# Patient Record
Sex: MALE | Race: BLACK OR AFRICAN AMERICAN | Employment: FULL TIME | URBAN - METROPOLITAN AREA
[De-identification: names, ages, dates, MRNs, and addresses within clinical notes are randomized per-mention and may not be internally consistent; named-entity substitution may affect disease eponyms.]

---

## 2023-10-03 ENCOUNTER — TREATMENT (OUTPATIENT)
Dept: PHYSICAL THERAPY | Facility: CLINIC | Age: 33
End: 2023-10-03
Payer: COMMERCIAL

## 2023-10-03 DIAGNOSIS — M25.511 RIGHT SHOULDER PAIN: Primary | ICD-10-CM

## 2023-10-03 PROCEDURE — 97110 THERAPEUTIC EXERCISES: CPT | Mod: GP | Performed by: PHYSICAL THERAPIST

## 2023-10-03 NOTE — PROGRESS NOTES
Physical Therapy    Physical Therapy Treatment    Patient Name: Tony Elder  MRN: [id number]  Today's Date: 10/3/2023  Time Calculation  Start Time: 0830  Stop Time: 0915  Time Calculation (min): 45 min    INSURANCE:   Visit #2  4 visits Approved from 9/1 - 11/24/23  Auth needed.    ASSESSMENT:  Full ROM.  No pain today.  No pain with therex.  Does c/o intermittent pain.     PLAN:  Continue with shoulder stabilization.     PRECAUTIONS: none.      SUBJECTIVE:  Patient feels as though he is improving.  Intermittent R shoulder pain.        - Pain: intermittent mild pain.  - HEP adherence / understanding:  Pt reports compliance      OBJECTIVE:  Full pain free ROM post sessoin.      TREATMENT:  - Therex:Access Code: [email or web address] - updated 10/3      * Alternate isometrics with isotonics*   - Standing Isometric Shoulder External Rotation with Doorway - 2 x daily - 3 x weekly - 5 reps - 30 second hold - NP - cont with HEP  - Standing Isometric Shoulder Internal Rotation at Doorway - 2 x daily - 3 x weekly - 5 reps - 30 second hold - NP cont with HEP    Black and Silver band issued.  - Shoulder Internal Rotation with Resistance - 3 x weekly - 3 sets - 10 reps - NP continue with HEP  - Shoulder External Rotation with Anchored Resistance - 3 x weekly - 3 sets - 10 reps.  - NP cont with HEP  - Rhythmic stabs ER with arm at sides  - rhythmic stabs OH palms down horiz Abd   - rhythmic stabs  horiz abd 3 ways  - ER in 90 Abd  - Serratus up wall with Y band 2 x 10  - Cable Column Row: 20# 2 x 10  - ER in 90 Abd

## 2023-10-17 PROBLEM — M23.8X2 CREPITUS OF JOINT OF LEFT KNEE: Status: ACTIVE | Noted: 2023-10-17

## 2023-10-17 PROBLEM — S83.512A LEFT ANTERIOR CRUCIATE LIGAMENT TEAR: Status: ACTIVE | Noted: 2023-10-17

## 2023-10-17 PROBLEM — S69.90XA HAND INJURY: Status: ACTIVE | Noted: 2023-10-17

## 2023-10-17 PROBLEM — B35.4 TINEA CORPORIS: Status: ACTIVE | Noted: 2023-10-17

## 2023-10-17 PROBLEM — B07.9 VIRAL WART, UNSPECIFIED: Status: ACTIVE | Noted: 2022-05-25

## 2023-10-17 PROBLEM — E66.3 OVERWEIGHT: Status: ACTIVE | Noted: 2023-10-17

## 2023-10-17 PROBLEM — I49.3 PVC (PREMATURE VENTRICULAR CONTRACTION): Status: ACTIVE | Noted: 2023-10-17

## 2023-10-17 PROBLEM — R30.0 DYSURIA: Status: ACTIVE | Noted: 2023-10-17

## 2023-10-17 PROBLEM — A63.0 GENITAL WARTS: Status: ACTIVE | Noted: 2023-10-17

## 2023-10-17 PROBLEM — A60.02 HERPES GENITALIS IN MEN: Status: ACTIVE | Noted: 2023-10-17

## 2023-10-17 PROBLEM — M25.662 DECREASED RANGE OF MOTION OF LEFT KNEE: Status: ACTIVE | Noted: 2023-10-17

## 2023-10-17 RX ORDER — MELOXICAM 15 MG/1
15 TABLET ORAL DAILY
COMMUNITY
Start: 2023-08-21

## 2023-10-17 RX ORDER — VALACYCLOVIR HYDROCHLORIDE 500 MG/1
500 TABLET, FILM COATED ORAL DAILY
COMMUNITY

## 2023-10-17 NOTE — PROGRESS NOTES
Physical Therapy    Physical Therapy Treatment    Patient Name: Tony Elder  MRN: [id number]  Today's Date: @TD@       INSURANCE:   Visit #3  4 visits Approved from 9/1 - 11/24/23  Auth needed.    Diagnoses: @Dx@    ASSESSMENT:  Full ROM.  No pain today.  No pain with therex.  Does c/o intermittent pain.     PLAN:  Continue with shoulder stabilization.     PRECAUTIONS: none.      SUBJECTIVE:  Patient feels as though he is improving.  Intermittent R shoulder pain.        - Pain: intermittent mild pain.  - HEP adherence / understanding:  Pt reports compliance      OBJECTIVE:  Full pain free ROM post sessoin.      TREATMENT:  - Therex:Access Code: [email or web address] - updated 10/3      * Alternate isometrics with isotonics*   - Standing Isometric Shoulder External Rotation with Doorway - 2 x daily - 3 x weekly - 5 reps - 30 second hold - NP - cont with HEP  - Standing Isometric Shoulder Internal Rotation at Doorway - 2 x daily - 3 x weekly - 5 reps - 30 second hold - NP cont with HEP    Black and Silver band issued.  - Shoulder Internal Rotation with Resistance - 3 x weekly - 3 sets - 10 reps - NP continue with HEP  - Shoulder External Rotation with Anchored Resistance - 3 x weekly - 3 sets - 10 reps.  - NP cont with HEP  - Rhythmic stabs ER with arm at sides  - rhythmic stabs OH palms down horiz Abd   - rhythmic stabs  horiz abd 3 ways  - ER in 90 Abd  - Serratus up wall with Y band 2 x 10  - Cable Column Row: 20# 2 x 10  - ER in 90 Abd

## 2023-10-18 ENCOUNTER — TREATMENT (OUTPATIENT)
Dept: PHYSICAL THERAPY | Facility: CLINIC | Age: 33
End: 2023-10-18
Payer: COMMERCIAL

## 2023-10-18 DIAGNOSIS — M25.511 RIGHT SHOULDER PAIN: Primary | ICD-10-CM

## 2023-10-18 PROCEDURE — 97110 THERAPEUTIC EXERCISES: CPT | Mod: GP | Performed by: PHYSICAL THERAPIST

## 2023-10-18 NOTE — PROGRESS NOTES
"Physical Therapy    Physical Therapy Treatment    Patient Name: Tony Elder  MRN: [id number]  Today's Date: 10/18/2023  Time Calculation  Start Time: 0830  Stop Time: 0915  Time Calculation (min): 45 min    INSURANCE:   Visit #3  4 visits Approved from 9/1 - 11/24/23  Auth needed.    ASSESSMENT:  Full ROM.  No pain today.  No pain with therex.  Does c/o intermittent random pain R anterior shoulder that can be as high as 6/10 with GH movements.  Specifically shoulder ext seems to be aggravating.     PLAN:  Injury was several months ago.  Pt does not feel like he is progressing.  He is asking about an MRI.  He is HEP compliant.   Advised him to call Dr London office for further recommendation.   PRECAUTIONS: none.      SUBJECTIVE:  Patient reports intermittent pain upto 6/10 in the R anterior shoulder.         - Pain: intermittent mild pain.  - HEP adherence / understanding:  Pt reports compliance      OBJECTIVE:  Full pain free ROM throughout session.  + obriens.  Negative apprehension.      TREATMENT:  - Therex:Access Code: [email or web address] - updated 10/3      * Alternate isometrics with isotonics*   - Standing Isometric Shoulder External Rotation with Doorway - 2 x daily - 3 x weekly - 5 reps - 30 second hold - NP - cont with HEP  - Standing Isometric Shoulder Internal Rotation at Doorway - 2 x daily - 3 x weekly - 5 reps - 30 second hold - NP cont with HEP    Rhythmic stabs ER, 90 flexion, 120 flexion  GREEN  Ball on wall SLS Abd and Flexion up / down, side/side: x 30\" each  ER/IR in 90 Abd SLS:  3 x 10   Ys: red 3 x 10   Serratus punch: minimal weight: 3 x 10     **ACTIVITIES BELOW WERE NOT PERFORMED**   Black and Silver band issued.  - Shoulder Internal Rotation with Resistance - 3 x weekly - 3 sets - 10 reps - NP continue with HEP  - Shoulder External Rotation with Anchored Resistance - 3 x weekly - 3 sets - 10 reps.  - NP cont with HEP  - Rhythmic stabs ER with arm at sides  - rhythmic stabs OH palms down horiz Abd "   - rhythmic stabs  horiz abd 3 ways  - ER in 90 Abd  - Serratus up wall with Y band 2 x 10  - Cable Column Row: 20# 2 x 10  - ER in 90 Abd

## 2023-10-31 ENCOUNTER — TREATMENT (OUTPATIENT)
Dept: PHYSICAL THERAPY | Facility: CLINIC | Age: 33
End: 2023-10-31
Payer: COMMERCIAL

## 2023-10-31 DIAGNOSIS — M25.511 RIGHT SHOULDER PAIN: Primary | ICD-10-CM

## 2023-10-31 PROCEDURE — 97110 THERAPEUTIC EXERCISES: CPT | Mod: GP | Performed by: PHYSICAL THERAPIST

## 2023-10-31 NOTE — PROGRESS NOTES
"Physical Therapy    Physical Therapy Treatment    Patient Name: Tony Elder  MRN: [id number]  Today's Date: 10/31/2023  Time Calculation  Start Time: 0832  Stop Time: 0916  Time Calculation (min): 44 min    INSURANCE:   Visit #4  4 visits Approved from 9/1 - 11/24/23  Auth needed.  Last session 11/7/23    ASSESSMENT:  Full ROM.  No pain today.  No pain with therex.  Does c/o intermittent random pain R anterior shoulder that can be as high as 6/10 with GH movements.  Specifically shoulder ext seems to be aggravating.     PLAN:  Injury was several months ago.  Pt does not feel like he is progressing.  He is asking about an MRI.  He is HEP compliant.   Advised him to call Dr London office for further recommendation.   PRECAUTIONS: none.      SUBJECTIVE:  Patient reports intermittent pain upto 6/10 in the R anterior shoulder.    No pain currently.     - Pain: intermittent mild pain.  - HEP adherence / understanding:  Pt reports compliance      OBJECTIVE:  Full pain free ROM throughout session.  + obriens.  Negative apprehension.      TREATMENT:  - Therex:Access Code: [email or web address] - updated 10/3      * Alternate isometrics with isotonics*     - Standing Isometric Shoulder External Rotation with Doorway - 2 x daily - 3 x weekly - 5 reps - 30 second hold - NP - cont with HEP  - Standing Isometric Shoulder Internal Rotation at Doorway - 2 x daily - 3 x weekly - 5 reps - 30 second hold - NP cont with HEP    Arm Bike: 2.5/ s.5 level 15  Ball on wall SLS Abd and Flexion up / down, side/side: x 30\" each, 1# wrist weight  Cable Column ER: 5# 3 x 10   Cable Column IR: 10# 3 x 10   Ball Dribble wall in 90/90, OH dribble: 30\" ea  Ball drop and catch 90 flex, 90 Abd: 20 x ea  Ball drop and catch: prone      Rhythmic stabs ER, 90 flexion, 120 flexion  GREEN      ER SLS R 3 x 10       IR SLS Black 3 x 10   Rhythmic stabs supien 90 flex and s/l 90 abd 30\" x 2  ea    *ACTIVITIES BELOW WERE NOT PERFORMED**   Black and Silver band " issued.  - Shoulder Internal Rotation with Resistance - 3 x weekly - 3 sets - 10 reps - NP continue with HEP  - Shoulder External Rotation with Anchored Resistance - 3 x weekly - 3 sets - 10 reps.  - NP cont with HEP  - Rhythmic stabs ER with arm at sides  - rhythmic stabs OH palms down horiz Abd   - rhythmic stabs  horiz abd 3 ways  - ER in 90 Abd  - Serratus up wall with Y band 2 x 10  - Cable Column Row: 20# 2 x 10  - ER in 90 Abd

## 2023-11-07 ENCOUNTER — TREATMENT (OUTPATIENT)
Dept: PHYSICAL THERAPY | Facility: CLINIC | Age: 33
End: 2023-11-07
Payer: COMMERCIAL

## 2023-11-07 DIAGNOSIS — M25.511 RIGHT SHOULDER PAIN: Primary | ICD-10-CM

## 2023-11-07 PROCEDURE — 97110 THERAPEUTIC EXERCISES: CPT | Mod: GP | Performed by: PHYSICAL THERAPIST

## 2024-04-16 ENCOUNTER — OFFICE VISIT (OUTPATIENT)
Dept: ORTHOPEDIC SURGERY | Facility: CLINIC | Age: 34
End: 2024-04-16
Payer: COMMERCIAL

## 2024-04-16 ENCOUNTER — HOSPITAL ENCOUNTER (OUTPATIENT)
Dept: RADIOLOGY | Facility: CLINIC | Age: 34
Discharge: HOME | End: 2024-04-16
Payer: COMMERCIAL

## 2024-04-16 DIAGNOSIS — M25.551 RIGHT HIP PAIN: ICD-10-CM

## 2024-04-16 PROCEDURE — 99213 OFFICE O/P EST LOW 20 MIN: CPT | Performed by: ORTHOPAEDIC SURGERY

## 2024-04-16 PROCEDURE — 73502 X-RAY EXAM HIP UNI 2-3 VIEWS: CPT | Mod: RT

## 2024-04-16 PROCEDURE — 73502 X-RAY EXAM HIP UNI 2-3 VIEWS: CPT | Mod: RIGHT SIDE | Performed by: RADIOLOGY

## 2024-04-16 RX ORDER — METHYLPREDNISOLONE 4 MG/1
TABLET ORAL
Qty: 21 TABLET | Refills: 0 | Status: SHIPPED | OUTPATIENT
Start: 2024-04-16

## 2024-04-16 NOTE — PROGRESS NOTES
"    History of Present Illness:   Patient presents today for evaluation of right posterior hip, buttock pain.  Patient also endorses some mild low back pain.  There is no recent trauma or any significant history of lumbar spine issues.  The patient does not endorse any significant groin or anterior hip pain.  The pain is described as episodic severe and does radiate down the leg the buttock posteriorly.  Regarding prior treatments: He has done some light home stretching and exercises to \"stretch it out\" as he feels it is tight along the posterior buttock and thigh.    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    Patient is known to us for prior ACL reconstruction.\    Review of Systems   GENERAL: Negative  GI: Negative  MUSCULOSKELETAL: See HPI  SKIN: Negative  NEURO:  Negative    Physical Examination:  Right hip:  Normal gait  Negative Trendelenburg sign  Skin healthy and intact  No tenderness to palpation over lumbar spine  No tenderness over greater trochanter    Full forward flexion  Symmetric motion, no loss of internal rotation   No weakness with resisted hip flexion, abduction or adduction    Negative flexion/adduction/internal rotation  Negative flexion/abduction/external rotation test  Pain with straight leg raise    Motor exam: Gross strength intact knee flexion extension, hip flexion extension, ankle dorsiflexion plantarflexion without any obvious deficits  Sensation intact L2-S1  No upper motor neuron signs    Imaging:  Plain films bilateral hip today reveal no acute fractures or dislocation, good alignment and position, no evidence of DJD.    Assessment:   Patient with right lumbar radiculopathy    Plan:  We reviewed the disease process with the patient we discussed a variety of treatment options.  We reviewed the importance of physical therapy including home exercises for core stretching and stability, hamstring " flexibility etc.  We reviewed oral medications including NSAIDs risks and benefits, Medrol Dosepak, muscle relaxers.    We discussed treatment options ranging from nonoperative, physical therapy medications, image guided injections, surgical intervention etc.    We will refer to the spine team for evaluation.  We also discussed Medrol Dosepak and physical therapy as we do feel this will substantially help him over the short course of treatment.    Darrion Collier PA-C    In a face to face encounter, I evaluated the patient and performed a physical examination, discussed pertinent diagnostic studies if indicated and discussed diagnosis and management strategies with both the patient and physician assistant / nurse practitioner.  I reviewed the PA/NP's note and agree with the documented findings and plan of care.        Rosalino Romero MD

## 2024-05-08 ENCOUNTER — EVALUATION (OUTPATIENT)
Dept: PHYSICAL THERAPY | Facility: CLINIC | Age: 34
End: 2024-05-08
Payer: COMMERCIAL

## 2024-05-08 DIAGNOSIS — M25.551 RIGHT HIP PAIN: ICD-10-CM

## 2024-05-08 PROCEDURE — 97110 THERAPEUTIC EXERCISES: CPT | Mod: GP

## 2024-05-08 PROCEDURE — 97161 PT EVAL LOW COMPLEX 20 MIN: CPT | Mod: GP

## 2024-05-08 ASSESSMENT — PATIENT HEALTH QUESTIONNAIRE - PHQ9
2. FEELING DOWN, DEPRESSED OR HOPELESS: NOT AT ALL
1. LITTLE INTEREST OR PLEASURE IN DOING THINGS: NOT AT ALL
SUM OF ALL RESPONSES TO PHQ9 QUESTIONS 1 AND 2: 0

## 2024-05-08 NOTE — PROGRESS NOTES
Physical Therapy    Physical Therapy Evaluation    Patient Name: Tony Elder  MRN: [id number]  Today's Date: 05/08/24  Time Calculation  Start Time: 0955  Stop Time: 1030  Time Calculation (min): 35 min    Therapy Diagnoses:    Problem List Items Addressed This Visit    None  Visit Diagnoses         Codes    Right hip pain     M25.551    Relevant Orders    Follow Up In Physical Therapy            Visit #: 1     Insurance:   Payor: [employer] / Plan: [employer] / Product Type: *No Product type* /   Authorization required: yes  Authorized visits: TBD  Authorized date range: TBD   Referring Physician: Rosalino Romero MD      Subjective:  Reason For Visit: Initial Evaluation  - CC: Patient arrives with complaint of constant low level low back pain and intermittent sharp R hip pain.  - DOI: 03/15/2024  - SARAH: insidious onset  - IMAGING: x-ray reveals unremarkable radiographs of R hip   - HX: ACLR 2022  - PAIN: CURRENT: (3/10); BEST: (0/10); WORST: (9/10); LOCATION: (R upper gluteals to lateral thigh); Description: constant dull / occasional sharp  - ALLEVIATES PAIN: walking   - EXACERBATES PAIN: sleeping, first movements  - CURRENT MEDICAL MANAGEMENT: Has attempted naproxen and Z pack without relief   - PLOF: Patient reports no functional limitations prior to injury.   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 )   - EXERCISE: body weight exercises infrequently  - PATIENT'S GOAL: reduce pain, do sit ups, get back to 100% function       Precautions:  Fall Risk: None  PMH: L ACL 2022       Objective:   Other Measures  Lower Extremity Funtional Score (LEFS): 60    POSTURE: fair - forward flexed posture in sitting      LUMBAR AROM (in standing):   WNL all directions without symptom provocation    REPEATED MOVEMENTS:   Standing Ext: No better / no worse   Standing Flex: No better / no worse   Supine Flex: No better / no worse   Prone Ext:  No better / no worse      HIP PROM: WNL and pain free in all directions bilaterally.      MYOTOMES: LEs WNL bilaterally  MMT (out of 5)   Hip Flex (L1/L2) R/L: 4+ / 4+  Knee Ext (L3) R/L:  5 / 5  Ankle DF (L4) R/L: 5 / 5  GT Ext (L5) R/L: 5 / 5  Ankle PF/Inv (S1) R/L: 5 / 5  Knee Flex (S2) R/L: 5 / 5    Hip Abd R/L: 4/4  Hip Ext R/L: 4-/4-    SPECIAL TESTS:   SLR: Neg to 90 deg bilaterally  Contralateral SLR: Neg   Spring: Neg     PALPATION: Patient reports some sensitivity with palpation of R iliac crest and upper gluteals. Patient denies pain upon palpation of the lumbar spinous processes, lumbar transverse processes, sacral segments, and along the pathway of the sciatic nerve.      OBSERVATION: Patient initially demoed loss of pelvic plane performing bridge with excessive lumbar extension leading to pain. Instructed on neutral spine position, eliminating pain.        Goals:  -LEFS: 75/80 to to indicate a significant improvement in overall function.  -Modified Oswestry: 0-19% impairment to indicate a significant improvement in overall function.  -Patient will demonstrate correct posture with min to no cueing to allow for correct loading strategy   -Patient will demonstrate 5/5 hip strength to allow for correct gait and lifting mechanics.   -Patient will report standing for >30 minutes without pain to allow for full participation at work.   -Patient will demonstrate appropriate body mechanics for lifting, pushing, pulling, and common functional activities to reduce incidence or future back pain exacerbations.      Treatment:   1) Initial evaluation - Low complexity (25 minutes)   2) Patient educated on POC, HEP, and current condition.   3) Therapeutic exercise performed (10 minutes)  4) HEP Provided (See Below)     Access Code: [email or web address]  URL: [email or web address]  Date: 05/08/2024  Prepared by: Ozzie Ceja    Exercises  - Plank on Knees  - 1 x daily - 7 x weekly - 3 sets - 30 sec hold  - Supine Bridge with  Resistance Band  - 1 x daily - 7 x weekly - 3 sets - 12 reps  - Clamshell with Resistance  - 1 x daily - 7 x weekly - 3 sets - 12 reps - 3 sec hold    Assessment:   Tony Elder is a 33 y.o. year old male who participated in a physical therapy evaluation today due to complaint of R hip pain. Patient reports a constant dull ache in lumbar spine and intermittently severe sharp pain in upper gluteals and lateral hip. The patient's findings are consistent with lumbar radiculopathy, most likely due to core instability. Their impairments include Pain, Strength, Activity limitations, Motor function/control, Posture, Decreased functional level, Aerobic capacity/endurance, and Participation restrictions. Due to these impairments the patient is unable to perform Lifting, Sleeping, Working, Standing, and Sitting. Tony will benefit from continued skilled physical therapy as well as development of a home exercise program to address current impairments and progress towards return to their prior level of function without limitations.    Rehab Potential: good    Clinical Presentation:  Stable and/or uncomplicated characteristics    Level of Complexity: Low    Plan:     Recommended Treatment:  Therapeutic exercise, Manual therapy, Home program instruction and progression, Neuromuscular re-education, Therapeutic activities, Self care and home management, Instruction in activity modification, Electrical stimulation, Vasopneumatic device + cold, and Dry Needling    Recommended Visits: 1-2 visits x 6-8 weeks     Patient in agreement with POC.

## 2024-05-15 ENCOUNTER — TREATMENT (OUTPATIENT)
Dept: PHYSICAL THERAPY | Facility: CLINIC | Age: 34
End: 2024-05-15
Payer: COMMERCIAL

## 2024-05-15 DIAGNOSIS — M25.551 RIGHT HIP PAIN: ICD-10-CM

## 2024-05-15 PROCEDURE — 97140 MANUAL THERAPY 1/> REGIONS: CPT | Mod: GP

## 2024-05-15 PROCEDURE — 97110 THERAPEUTIC EXERCISES: CPT | Mod: GP

## 2024-05-15 NOTE — PROGRESS NOTES
Physical Therapy Treatment     Patient Name: Tony Elder  MRN: [id number]  Today's Date: 05/15/24  Time Calculation  Start Time: 0230  Stop Time: 0308  Time Calculation (min): 38 min    Visit #  2    INSURANCE  Payor: [employer] / Plan: [employer] / Product Type: *No Product type* /   Authorization required: yes  Authorized visits: 6  Authorized date range: AUTH 6V 5/8-6/28/24    Referring Physician: Rosalino Romero MD     CURRENT PROBLEM  Problem List Items Addressed This Visit    None  Visit Diagnoses         Codes    Right hip pain     M25.551            ASSESSMENT    Patient presented without significant changes. Pain now at 0/10, but remains up to 9/10 with first movement in morning. ROM unchanged since last visit, but remains excellent. Progressed through instruction of side star plank and bird dog to continue core strengthening/stabilization . Required only min verbal cueing and tactile cueing to complete today's progression with correct form. Patient tolerated today's session was tolerated well without any increase in pain or irritation. Additionally discussed sleep positions and gradual movement progression for mornings. They are progressing towards their goals as evidenced by improvement in understanding. At this time, the patient would continue to benefit from skilled PT to address remaining functional, objective and subjective deficits to allow them to return to their prior level of function.      PLAN  Continue to focus on core strength, trunk stability, and functional mobility per POC.     SUBJECTIVE  General: Patient arrived reporting no real improvement. Pain gets better throughout day, but remains very high with radicular/referred symptoms in morning/     Pain:   Intensity: 0 /10  Location: R hip/lumbar  Description: sharp     HEP Update:   Compliant: yes; 7 days per week    Precautions:   Fall Risk: None  PMH: L ACL 2022      OBJECTIVE  No longer reports pain with palpation of R iliac crest and  "upper gluteals.        TREATMENT    Therapeutic Exercise:     33 minutes  Recumbent bike level 8 x 5 min   Supine bridge + black TB 2 x 12   Side-lying clamshell black TB 2 x 12   Side plank on knees + hip abd 2 x 30\" R/L  Full plank on elbows 2 x 30\"     Manual Therapy:      5 minutes  STM and TP release lumbar paraspinals  PA glide grade lumbar spine    Neuromuscular Re-education:     minutes  none    Modalities:        minutes  none    Gait Training:            minutes  None    EDUCATION    Education Provided: Reviewed home exercise program. Home exercise program instructed and issued. Provided manual cues for correct performance of exercises. Provided verbal feedback to improve exercise technique.    HEP Access Code: [email or web address]      "

## 2024-05-29 ENCOUNTER — APPOINTMENT (OUTPATIENT)
Dept: PHYSICAL THERAPY | Facility: CLINIC | Age: 34
End: 2024-05-29
Payer: COMMERCIAL

## 2024-05-29 NOTE — PROGRESS NOTES
Physical Therapy Treatment     Patient Name: Tony Elder  MRN: [id number]  Today's Date: 05/29/24       Visit #  Visit count could not be calculated. Make sure you are using a visit which is associated with an episode.    INSURANCE  Payor: [facility] / Plan: [facility] / Product Type: *No Product type* /   Authorization required: yes  Authorized visits: 6  Authorized date range: AUTH 6V 5/8-6/28/24    Referring Physician: Rosalino Romero MD     CURRENT PROBLEM  Problem List Items Addressed This Visit    None        ASSESSMENT    Patient presented without significant changes. Pain now at 0/10, but remains up to 9/10 with first movement in morning. ROM unchanged since last visit, but remains excellent. Progressed through instruction of side star plank and bird dog to continue core strengthening/stabilization . Required only min verbal cueing and tactile cueing to complete today's progression with correct form. Patient tolerated today's session was tolerated well without any increase in pain or irritation. Additionally discussed sleep positions and gradual movement progression for mornings. They are progressing towards their goals as evidenced by improvement in understanding. At this time, the patient would continue to benefit from skilled PT to address remaining functional, objective and subjective deficits to allow them to return to their prior level of function.      PLAN  Continue to focus on core strength, trunk stability, and functional mobility per POC.     SUBJECTIVE  General: Patient arrived reporting no real improvement. Pain gets better throughout day, but remains very high with radicular/referred symptoms in morning/     Pain:   Intensity: 0 /10  Location: R hip/lumbar  Description: sharp     HEP Update:   Compliant: yes; 7 days per week    Precautions:   Fall Risk: None  PMH: L ACL 2022      OBJECTIVE  No longer reports pain with palpation of R iliac crest and upper gluteals.        TREATMENT    Therapeutic  "Exercise:       minutes  Recumbent bike level 8 x 5 min   Supine bridge + black TB 2 x 12   Side-lying clamshell black TB 2 x 12   Side plank on knees + hip abd 2 x 30\" R/L  Full plank on elbows 2 x 30\"     Manual Therapy:        minutes  STM and TP release lumbar paraspinals  PA glide grade lumbar spine    Neuromuscular Re-education:     minutes  none    Modalities:        minutes  none    Gait Training:            minutes  None    EDUCATION    Education Provided: Reviewed home exercise program. Home exercise program instructed and issued. Provided manual cues for correct performance of exercises. Provided verbal feedback to improve exercise technique.    HEP Access Code: [email or web address]      "

## 2024-06-12 ENCOUNTER — DOCUMENTATION (OUTPATIENT)
Dept: PHYSICAL THERAPY | Facility: CLINIC | Age: 34
End: 2024-06-12
Payer: COMMERCIAL

## 2024-06-12 ENCOUNTER — APPOINTMENT (OUTPATIENT)
Dept: PHYSICAL THERAPY | Facility: CLINIC | Age: 34
End: 2024-06-12
Payer: COMMERCIAL

## 2024-06-12 NOTE — PROGRESS NOTES
Physical Therapy Communication Note    Patient Name: Tony Elder  MRN: [id number]  Today's Date: 6/12/2024     Missed Time: Cancel    Missed Visit Reason: Unknown    Communication: Patient left message at  canceling appointment this morning.

## 2024-06-12 NOTE — PROGRESS NOTES
Physical Therapy Treatment     Patient Name: Tony Elder  MRN: [id number]  Today's Date: 06/12/24       Visit #  Visit count could not be calculated. Make sure you are using a visit which is associated with an episode.    INSURANCE  Payor: [facility] / Plan: [facility] / Product Type: *No Product type* /   Authorization required: yes  Authorized visits: 6  Authorized date range: AUTH 6V 5/8-6/28/24    Referring Physician: Rosalino Romero MD     CURRENT PROBLEM  Problem List Items Addressed This Visit    None        ASSESSMENT    Patient presented without significant changes. Pain now at 0/10, but remains up to 9/10 with first movement in morning. ROM unchanged since last visit, but remains excellent. Progressed through instruction of side star plank and bird dog to continue core strengthening/stabilization . Required only min verbal cueing and tactile cueing to complete today's progression with correct form. Patient tolerated today's session was tolerated well without any increase in pain or irritation. Additionally discussed sleep positions and gradual movement progression for mornings. They are progressing towards their goals as evidenced by improvement in understanding. At this time, the patient would continue to benefit from skilled PT to address remaining functional, objective and subjective deficits to allow them to return to their prior level of function.      PLAN  Continue to focus on core strength, trunk stability, and functional mobility per POC.     SUBJECTIVE  General: Patient arrived reporting no real improvement. Pain gets better throughout day, but remains very high with radicular/referred symptoms in morning/     Pain:   Intensity: 0 /10  Location: R hip/lumbar  Description: sharp     HEP Update:   Compliant: yes; 7 days per week    Precautions:   Fall Risk: None  PMH: L ACL 2022      OBJECTIVE  No longer reports pain with palpation of R iliac crest and upper gluteals.        TREATMENT    Therapeutic  "Exercise:       minutes  Recumbent bike level 8 x 5 min   Supine bridge + black TB 2 x 12   Side-lying clamshell black TB 2 x 12   Side plank on knees + hip abd 2 x 30\" R/L  Full plank on elbows 2 x 30\"     Manual Therapy:        minutes  STM and TP release lumbar paraspinals  PA glide grade lumbar spine    Neuromuscular Re-education:     minutes  none    Modalities:        minutes  none    Gait Training:            minutes  None    EDUCATION    Education Provided: Reviewed home exercise program. Home exercise program instructed and issued. Provided manual cues for correct performance of exercises. Provided verbal feedback to improve exercise technique.    HEP Access Code: [email or web address]      "

## 2024-06-13 ENCOUNTER — OFFICE VISIT (OUTPATIENT)
Dept: ORTHOPEDIC SURGERY | Facility: CLINIC | Age: 34
End: 2024-06-13
Payer: COMMERCIAL

## 2024-06-13 ENCOUNTER — HOSPITAL ENCOUNTER (OUTPATIENT)
Dept: RADIOLOGY | Facility: HOSPITAL | Age: 34
Discharge: HOME | End: 2024-06-13
Payer: COMMERCIAL

## 2024-06-13 DIAGNOSIS — M54.10 BACK PAIN WITH RADICULOPATHY: Primary | ICD-10-CM

## 2024-06-13 DIAGNOSIS — M54.50 LUMBAR PAIN: ICD-10-CM

## 2024-06-13 PROCEDURE — 72110 X-RAY EXAM L-2 SPINE 4/>VWS: CPT

## 2024-06-13 PROCEDURE — 72110 X-RAY EXAM L-2 SPINE 4/>VWS: CPT | Performed by: RADIOLOGY

## 2024-06-13 PROCEDURE — 99214 OFFICE O/P EST MOD 30 MIN: CPT | Performed by: PHYSICIAN ASSISTANT

## 2024-06-13 RX ORDER — CYCLOBENZAPRINE HCL 10 MG
10 TABLET ORAL 3 TIMES DAILY PRN
Qty: 30 TABLET | Refills: 0 | Status: SHIPPED | OUTPATIENT
Start: 2024-06-13 | End: 2024-06-23

## 2024-06-13 ASSESSMENT — PAIN - FUNCTIONAL ASSESSMENT: PAIN_FUNCTIONAL_ASSESSMENT: 0-10

## 2024-06-13 ASSESSMENT — PAIN SCALES - GENERAL: PAINLEVEL_OUTOF10: 7

## 2024-06-13 NOTE — PROGRESS NOTES
The patient just established with the practice comes the office complaining of back pain going on for short while.  He is get radiculopathy down the right leg.  Denies bowel or bladder complaints saddle anesthesia gait or balance changes.  Denies any real trauma acceptable to cause it.  He said no spine surgeries in the past.  Patient is also here with his I believe his girlfriend is also historian.  And states he been doing physical therapy out in [place].  Since he saw Dr. Romero.  Is not helping    We looked at patient's recent blood work.  Checked a metabolic panel.  Glucose of 82 sodium 137 potassium 3.8 looked at primary doctors notes check medication list on the patient see if he is on a statin he is not taking any.    Physical exam: Well-nourished, well kept.  No lymphangitis or lymphadenopathy in the examined extremities.  Good perfusion to the extremities ×4.  Radial and dorsalis pedis pulses 2+.  Capillary refill to all 4 digits brisk.  No distal edema x 4.  Gait normal.  Can walk on heels and toes.  Examination of the neck reveals no swelling, step-off, or point tenderness.  Range of motion with flexion, extension, side bending and rotation is well maintained without crepitance, instability, or exacerbation of pain.  Strength is within normal limits.  There is decreased range of motion flexion-extension rotation lumbar spine mildly tender good strength no instability.  Examination of the upper extremities reveals no point tenderness, swelling, or deformity.  Range of motion of the shoulders, elbows, wrists, and fingers are all full without crepitance, instability, or exacerbation of pain.  Strength is 5/5 throughout.  Examination of the lower extremities reveals no point tenderness, swelling, or deformity.  Range of motion of the hips, knees, and ankles are full without crepitance, instability, or exacerbation of pain.  Strength is 5/5 throughout.  No redness, abrasions, or lesions on all 4 extremities,  head and neck, or trunk.  Gross sensation intact in the extremities ×4.  Deep tendon reflexes 2+ and symmetric bilaterally.  Lily, clonus, and Babinski were negative.  Straight leg raise negative.  Affect normal.  Alert and oriented ×3.  Coordination normal.    X-ray: X-ray lumbar spine shows mild arthritic degenerative changes with no fractures dislocations or bony lytic lesions.    Assessment: This a patient with low back pain right leg pain radicular pain needs further workup.  Conservative treatment over-the-counter meds steroids and prescription meds and therapy are not helping.  Needs further workup.    Plan: We will try little muscle relaxant will get an MRI at [facility] for diagnostic purposes for either injections or surgery if needed

## 2024-06-19 ENCOUNTER — TREATMENT (OUTPATIENT)
Dept: PHYSICAL THERAPY | Facility: CLINIC | Age: 34
End: 2024-06-19
Payer: COMMERCIAL

## 2024-06-19 DIAGNOSIS — M25.551 RIGHT HIP PAIN: ICD-10-CM

## 2024-06-19 PROCEDURE — 97140 MANUAL THERAPY 1/> REGIONS: CPT | Mod: GP

## 2024-06-19 PROCEDURE — 97110 THERAPEUTIC EXERCISES: CPT | Mod: GP

## 2024-06-19 NOTE — PROGRESS NOTES
Physical Therapy Treatment     Patient Name: Tony Elder  MRN: [id number]  Today's Date: 06/19/24  Time Calculation  Start Time: 0901  Stop Time: 0945  Time Calculation (min): 44 min    Visit #  3    [facility]  [facility] / Plan: [employer] / Product Type: *No Product type* /   Authorization required: yes  Authorized visits: 6  Authorized date range: AUTH 6V 5/8-6/28/24    Referring Physician: Rosalino Romero MD     CURRENT PROBLEM  Problem List Items Addressed This Visit    None  Visit Diagnoses         Codes    Right hip pain     M25.551            ASSESSMENT    Patient presented with worsening of symptoms. Pain now at 9/10 and remains worst in mornings or after long periods of sitting. Pain also now down leg to foot on arrival. Increased sensitivity to palpation noted over, R lumbar paraspinals, R upper gluteals and along pathway of sciatic nerve. Attempted prone on elbows followed by prone press ups which provided some improved fluidity of motion, but no change in radicular symptoms. Was provided MT including first attempt of dry needling without adverse reactions. Patient tolerated today's session was tolerated well reporting reduction of radicular symptoms to lateral hip following MT. Patient is not progressing towards his goals at this time. The patient would continue to benefit from skilled PT to address remaining functional, objective and subjective deficits to allow them to return to their prior level of function.      PLAN  Continue to focus on core strength, trunk stability, and functional mobility per POC.     SUBJECTIVE  General: Patient arrived reporting no improvement. States that he may consider injection. Pain most severe first in morning or after prolonged sitting.     Pain:   Intensity: 9 /10  Location: R hip/lumbar; radicular pain to R foot today  Description: sharp     HEP Update:   Compliant: yes; 7 days per week    Precautions:   Fall Risk: None  PMH: L ACL 2022      OBJECTIVE  No  "longer reports pain with palpation of R iliac crest and upper gluteals.        TREATMENT    Therapeutic Exercise:     14 minutes  Prone lying x 4 min   Prone press up 3 x 10     not performed this date:  Supine bridge + black TB 2 x 12   Side-lying clamshell black TB 2 x 12   Side plank on knees + hip abd 2 x 30\" R/L  Full plank on elbows 2 x 30\"   Recumbent bike level 8 x 5 min     Manual Therapy:      30 minutes  STM and TP release lumbar paraspinals  PA glide grade lumbar spine  Dry needling  Area(s): B L4-L5 lumbar paraspinals, R upper cluneals  Needle length: 50 mm  Needles used: 9  Dry needling technique used: placement without manipulation  * Patient education on purpose, precautions, safety, risks, and other treatment options regarding dry needling. Verbal and written consent received.     Neuromuscular Re-education:     minutes  none    Modalities:        minutes  none    Gait Training:            minutes  None    EDUCATION    Education Provided: Reviewed home exercise program. Home exercise program instructed and issued. Provided manual cues for correct performance of exercises. Provided verbal feedback to improve exercise technique.    HEP Access Code: [email or web address]      "

## 2024-06-26 ENCOUNTER — TREATMENT (OUTPATIENT)
Dept: PHYSICAL THERAPY | Facility: CLINIC | Age: 34
End: 2024-06-26
Payer: COMMERCIAL

## 2024-06-26 DIAGNOSIS — M25.551 RIGHT HIP PAIN: ICD-10-CM

## 2024-06-26 PROCEDURE — 97110 THERAPEUTIC EXERCISES: CPT | Mod: GP

## 2024-06-26 NOTE — PROGRESS NOTES
Physical Therapy Treatment / Discharge     Patient Name: Tony Elder  MRN: [id number]  Today's Date: 06/26/24  Time Calculation  Start Time: 0915  Stop Time: 0945  Time Calculation (min): 30 min    Visit #  4    INSURANCE  [facility]: [facility] / Plan: [employer] / Product Type: *No Product type* /   Authorization required: yes  Authorized visits: 6  Authorized date range: AUTH 6V 5/8-6/28/24    Referring Physician: Rosalino Romero MD     CURRENT PROBLEM  Problem List Items Addressed This Visit    None  Visit Diagnoses         Codes    Right hip pain     M25.551              ASSESSMENT    Patient arrived today with improved symptoms compared to last session. Does not have radicular pain below gluteal fold, but does continue to have mod pain in lumbar region. States that he received temporary relief of radicular symptoms following dry needling last session, but wishes to discontinue the treatment due to increased soreness in low back. Since last session, he has experienced some radicular symptoms to foot, but frequency has decreased significantly. Although Tony continues to report low back pain with radicular symptoms and min hip weakness, he feels he has a better understanding of self management and would like to continue with HEP indep at this time. HEP was reviewed with patient demoing excellent form and was educated on importance of continued strength, endurance, balance, and ROM exercise to continue addressing remaining deficits. At this time, patient is appropriate for discharge with HEP, as they have the tools and knowledge to continuing progressing independently.    Goals:  -LEFS: 75/80 to to indicate a significant improvement in overall function. - NOT MET  -Modified Oswestry: 0-19% impairment to indicate a significant improvement in overall function. - NOT MET  -Patient will demonstrate correct posture with min to no cueing to allow for correct loading strategy - MET  -Patient will demonstrate 5/5 hip  "strength to allow for correct gait and lifting mechanics. - PART MET  -Patient will report standing for >30 minutes without pain to allow for full participation at work. - PART MET  -Patient will demonstrate appropriate body mechanics for lifting, pushing, pulling, and common functional activities to reduce incidence or future back pain exacerbations. - NOT MET    PLAN  DC with HEP.      SUBJECTIVE  General: Patient arrived reporting some improvement following dry needling and use of prone program in morning before first movement. Does experience rad symptoms to foot at times, but less often.    Pain:   Intensity: 9 /10 in mornings with first movements, can reduce pain in LE following prone program; 4/10 today without radicular symptoms  Location: R hip/lumbar; radicular pain to R foot today  Description: sharp     HEP Update:   Compliant: yes; 7 days per week    Precautions:   Fall Risk: None  PMH: L ACL 2022      OBJECTIVE    Outcome Measures:  Other Measures  Lower Extremity Funtional Score (LEFS): 65  Oswestry Disablity Index (JACEK): 40    LUMBAR AROM (in standing):   WNL all directions without symptom provocation    HIP PROM: WNL and pain free in all directions bilaterally.      MYOTOMES: LEs WNL bilaterally  MMT (out of 5)   Hip Flex (L1/L2) R/L: 5/5  Knee Ext (L3) R/L:  5 / 5  Ankle DF (L4) R/L: 5 / 5  GT Ext (L5) R/L: 5 / 5  Ankle PF/Inv (S1) R/L: 5 / 5  Knee Flex (S2) R/L: 5 / 5    Hip Abd R/L: 5/5  Hip Ext R/L: 4+/4+    SPECIAL TESTS:   SLR: Neg to 90 deg bilaterally  Contralateral SLR: Neg   Spring: Neg     PALPATION: Patient reports some sensitivity with palpation of R iliac crest, upper gluteals, and along the pathway of the sciatic nerve.        TREATMENT    Therapeutic Exercise:     30 minutes  Prone lying x 2 min   Prone on elbows x 2 min  Prone press up 3 x 10   Pallof press black TB (x2) 2 x 30\" R/L   Full plank on elbows x 30\"   Side plank on knees + hip abd x 30\" R/L    not performed this " date:  Supine bridge + black TB 2 x 12   Side-lying clamshell black TB 2 x 12   Recumbent bike level 8 x 5 min     Manual Therapy:        minutes  none    Neuromuscular Re-education:     minutes  none    Modalities:        minutes  none    Gait Training:            minutes  None    EDUCATION    Education Provided: Home exercise program instructed and issued. Answered questions about home exercise program. Educated on importance of continued use of HEP.     HEP Access Code: [email or web address]

## 2024-07-19 ENCOUNTER — APPOINTMENT (OUTPATIENT)
Dept: RADIOLOGY | Facility: HOSPITAL | Age: 34
End: 2024-07-19
Payer: COMMERCIAL

## 2024-07-25 ENCOUNTER — TREATMENT (OUTPATIENT)
Dept: PHYSICAL THERAPY | Facility: CLINIC | Age: 34
End: 2024-07-25
Payer: COMMERCIAL

## 2024-07-25 DIAGNOSIS — M25.551 PAIN IN RIGHT HIP: ICD-10-CM

## 2024-07-25 PROCEDURE — 97110 THERAPEUTIC EXERCISES: CPT | Mod: GP

## 2024-07-25 PROCEDURE — 97014 ELECTRIC STIMULATION THERAPY: CPT | Mod: GP

## 2024-07-25 NOTE — PROGRESS NOTES
Physical Therapy Treatment Note      Patient Name Tony Elder  MRN: [id number]  Today's Date: 7/25/2024  Time Calculation  Start Time: 0930  Stop Time: 1010  Time Calculation (min): 40 min    Insurance: Payor: [employer] / Plan: [employer] / Product Type: *No Product type* / VISITS ARE MED NEC NEEDS AUTH   Visit #: 5  Date of Onset: 3/15/24  -authorization required: yes  -number of visits authorized 6 +4  -authorization/ certification dates: 5/8/24-6/28/24  New auth 7/17/24-8/16/24    General:  Next MD appt: Rosalino Romero MD none scheduled    Problem List Items Addressed This Visit             ICD-10-CM    Pain in right hip M25.551       Assessment:  skilled intervention:   Reasmt for report period 5/8/24-7/25/24  Modalities for sx relief    Patient would continue to benefit from skilled PT to address remaining functional, objective and subjective deficits to allow them to return to full independence with ADLs    Pt with cont RLE radiculopathy and now with + SLR  Responded to extension today with greatest relief prone over pillow-no difference with 2 pillows  Initiated IFC for soft tissue tightness/inflammation in RLS/glute area     Goals  Dec pain 50-75% on pain scale with de cpain with transition of movement  Inc AROM lumbar WNL  Inc core strength 5/5     Plan:  Check response to IFC  Consider addition of STM    Precautions: no fall risk    Subjective:  General:  Completed 4v of PT with no significant change  MRI ordered but did not have enough visits completed for approval  Steroid pack gave temporary relief    Functional Progress:  Static positioning makes pain worse  Transition of movement after prolonged position    Pain  Pain assessment 0-10  Pain score: 5  Pain location: R LS/R hip/RLE lateral to foot    Compliant with HEP:  yes    Understanding of HEP: WNL    Objective:  Therapeutic Exercise  25 minutes  see below  **indicates new exercises or  "progression  NP=not performed    Neuromuscular Re-education:    minutes  See below  **indicates new exercises or progression  NP=not performed        Modalities   Cold Pack                        15 minutes    Electric Stimulation  IFC/CP RLS prone over pillow int 11   x 15'   15 minutes    Other   Lumbar ROM  FB 60%  BB  100%  %  mild inc  %    Repeated Force Analysis    standing flexion: inc R hip pain/RLE  standing extension: same  supine flexion: same  repeated flexion: DKC 10x  same   hooklying: same  prone:  same   LASHAY:  same  PPU: same   repeated PPU: PPU 10x  Prone over pillow \"feels good\"  Pain to knee only  Prone over 2 pillows  no foot pain  Still to knee    Special tests   SLR: R + at 30 degrees     Strength  Abdominals  5-  back extensors 5-  R hip flex 5-  Ext 5-    Flexibility  Hamstrings: R 30 degrees due to + SLR  Piriformis: B WNL    Palpation POP R LS/glute  Tightness noted R LS/glute  Exercise Log:  Prone 2'  LASHAY 2'  PPU 10x  Prone over pillow 2'  Prone over 2 pillows 2'    Education:  Instructed in progression of exercises-advised to add prone over pillow to prone extension progression and to cont with previously instructed exericses        "

## 2024-07-31 ENCOUNTER — TREATMENT (OUTPATIENT)
Dept: PHYSICAL THERAPY | Facility: CLINIC | Age: 34
End: 2024-07-31
Payer: COMMERCIAL

## 2024-07-31 DIAGNOSIS — M25.551 PAIN IN RIGHT HIP: ICD-10-CM

## 2024-07-31 PROCEDURE — 97110 THERAPEUTIC EXERCISES: CPT | Mod: GP

## 2024-07-31 NOTE — PROGRESS NOTES
"                                                                Physical Therapy Treatment Note      Patient Name Tony Elder  MRN: [id number]  Today's Date: 7/31/2024  Time Calculation  Start Time: 0830  Stop Time: 0855  Time Calculation (min): 25 min    Insurance: Payor: [employer] / Plan: [employer] / Product Type: *No Product type* /  VISITS ARE MED NEC NEEDS AUTH   Visit #: 6  Date of Onset: 3/15/24   -authorization required: yes  -number of visits authorized 6 +4   -authorization/ certification dates: 5/8/24-6/28/24  New auth 7/17/24-8/16/24    General:  Next MD appt: Rosalino Romero MD none scheduled       Problem List Items Addressed This Visit             ICD-10-CM    Pain in right hip M25.551       Assessment:  Education for HEP/body mechanics    Pt has had no change in pain with PT intervention  Trial of modalities last visit with very temporary relief  Radicular sx to foot occur daily    -LEFS: 75/80 to to indicate a significant improvement in overall function.(Not met)  -Modified Oswestry: 0-19% impairment to indicate a significant improvement in overall function.(Not met)  -Patient will demonstrate correct posture with min to no cueing to allow for correct loading strategy (met)  -Patient will demonstrate 5/5 hip strength to allow for correct gait and lifting mechanics. (Partially met)  -Patient will report standing for >30 minutes without pain to allow for full participation at work. (Not met)  -Patient will demonstrate appropriate body mechanics for lifting, pushing, pulling, and common functional activities to reduce incidence or future back pain exacerbations.(Met)    Plan:  Discharge to HEP/physician follow up with likely MRI  for report period 5/8/24-7/30/24    Precautions: no fall risk    Subjective:  General:  Reports he is better  Found meds that are helping-\"back aid\"  Temporary relief from IFC    Functional Progress:  Cont to inc with prolonged sitting or ny prolonged position    Pain  Pain " assessment 0-10  Pain score: 6-7  Pain location: R LS/R hip/ RLE lateral to foot    Compliant with HEP:  yes    Understanding of HEP: WNL    Objective:  Therapeutic Exercise  25 minutes  see below  **indicates new exercises or progression  NP=not performed    Neuromuscular Re-education:    minutes  See below  **indicates new exercises or progression  NP=not performed    Other     Exercise Log:  Start pain to L foot  Prone over pillow 2' still 6-7/10 to knee  Prone 2' same  LASHAY 2' same  PPU 10x2 pain back to ankle      Education:  Review of HEP and progression  Review of proper body mechanics with lifting    Outcome Measures:  Other Measures  Lower Extremity Funtional Score (LEFS): 41  Oswestry Disablity Index (JACEK): 48

## 2024-08-20 ENCOUNTER — APPOINTMENT (OUTPATIENT)
Dept: RADIOLOGY | Facility: HOSPITAL | Age: 34
End: 2024-08-20
Payer: COMMERCIAL

## 2024-11-01 ENCOUNTER — PATIENT MESSAGE (OUTPATIENT)
Dept: PRIMARY CARE | Facility: CLINIC | Age: 34
End: 2024-11-01
Payer: COMMERCIAL

## 2024-11-01 DIAGNOSIS — A60.02 HERPES GENITALIS IN MEN: Primary | ICD-10-CM

## 2024-11-04 RX ORDER — VALACYCLOVIR HYDROCHLORIDE 500 MG/1
500 TABLET, FILM COATED ORAL DAILY
Qty: 30 TABLET | Refills: 2 | Status: SHIPPED | OUTPATIENT
Start: 2024-11-04